# Patient Record
Sex: MALE | Race: WHITE | URBAN - METROPOLITAN AREA
[De-identification: names, ages, dates, MRNs, and addresses within clinical notes are randomized per-mention and may not be internally consistent; named-entity substitution may affect disease eponyms.]

---

## 2022-09-20 ENCOUNTER — REFRACTIVE (OUTPATIENT)
Dept: URBAN - METROPOLITAN AREA CLINIC 21 | Facility: CLINIC | Age: 42
End: 2022-09-20

## 2022-09-20 DIAGNOSIS — H52.223 REGULAR ASTIGMATISM, BILATERAL: Primary | ICD-10-CM

## 2022-09-20 ASSESSMENT — VISUAL ACUITY
OD: 20/20
OS: 20/20

## 2022-09-20 NOTE — IMPRESSION/PLAN
Impression: Regular astigmatism, bilateral: H52.223. Plan: Patient is a candidate for I-LASIK, he is ware reading glasses will be needed for near vision. Discussed risks and benefits. Patient to return for a DFE and CHRIS. Reza rowland like to test his glasses more to decide if I LASIK is a procedure he would like to move forward with.